# Patient Record
Sex: MALE | Race: WHITE | Employment: FULL TIME
[De-identification: names, ages, dates, MRNs, and addresses within clinical notes are randomized per-mention and may not be internally consistent; named-entity substitution may affect disease eponyms.]

---

## 2017-01-18 ENCOUNTER — OFFICE VISIT (OUTPATIENT)
Dept: FAMILY MEDICINE CLINIC | Facility: CLINIC | Age: 52
End: 2017-01-18

## 2017-01-18 VITALS
BODY MASS INDEX: 31.08 KG/M2 | DIASTOLIC BLOOD PRESSURE: 68 MMHG | HEART RATE: 82 BPM | HEIGHT: 71 IN | WEIGHT: 222 LBS | SYSTOLIC BLOOD PRESSURE: 118 MMHG

## 2017-01-18 DIAGNOSIS — M1A.09X0 IDIOPATHIC CHRONIC GOUT OF MULTIPLE SITES WITHOUT TOPHUS: ICD-10-CM

## 2017-01-18 DIAGNOSIS — Z00.00 PHYSICAL EXAM: Primary | ICD-10-CM

## 2017-01-18 DIAGNOSIS — Z12.11 COLON CANCER SCREENING: ICD-10-CM

## 2017-01-18 DIAGNOSIS — Z12.5 PROSTATE CANCER SCREENING: ICD-10-CM

## 2017-01-18 PROCEDURE — 99396 PREV VISIT EST AGE 40-64: CPT | Performed by: FAMILY MEDICINE

## 2017-01-18 RX ORDER — HYDROCODONE BITARTRATE AND ACETAMINOPHEN 5; 325 MG/1; MG/1
TABLET ORAL
Refills: 0 | COMMUNITY
Start: 2017-01-15 | End: 2017-01-21

## 2017-01-18 ASSESSMENT — ENCOUNTER SYMPTOMS
BLOOD IN STOOL: 0
DIARRHEA: 0
COUGH: 0
CONSTIPATION: 0
SHORTNESS OF BREATH: 0
ABDOMINAL PAIN: 0
BACK PAIN: 0
WHEEZING: 0

## 2017-03-02 ENCOUNTER — HOSPITAL ENCOUNTER (OUTPATIENT)
Age: 52
Setting detail: SPECIMEN
Discharge: HOME | End: 2017-03-02
Payer: COMMERCIAL

## 2017-03-07 LAB — SURGICAL PATHOLOGY REPORT: NORMAL

## 2018-01-18 ENCOUNTER — OFFICE VISIT (OUTPATIENT)
Dept: FAMILY MEDICINE CLINIC | Age: 53
End: 2018-01-18
Payer: COMMERCIAL

## 2018-01-18 VITALS
BODY MASS INDEX: 30.4 KG/M2 | WEIGHT: 218 LBS | HEART RATE: 109 BPM | OXYGEN SATURATION: 98 % | RESPIRATION RATE: 16 BRPM | SYSTOLIC BLOOD PRESSURE: 140 MMHG | DIASTOLIC BLOOD PRESSURE: 94 MMHG

## 2018-01-18 DIAGNOSIS — M1A.09X0 IDIOPATHIC CHRONIC GOUT OF MULTIPLE SITES WITHOUT TOPHUS: ICD-10-CM

## 2018-01-18 PROCEDURE — 1036F TOBACCO NON-USER: CPT | Performed by: NURSE PRACTITIONER

## 2018-01-18 PROCEDURE — G8417 CALC BMI ABV UP PARAM F/U: HCPCS | Performed by: NURSE PRACTITIONER

## 2018-01-18 PROCEDURE — G8427 DOCREV CUR MEDS BY ELIG CLIN: HCPCS | Performed by: NURSE PRACTITIONER

## 2018-01-18 PROCEDURE — 3017F COLORECTAL CA SCREEN DOC REV: CPT | Performed by: NURSE PRACTITIONER

## 2018-01-18 PROCEDURE — G8484 FLU IMMUNIZE NO ADMIN: HCPCS | Performed by: NURSE PRACTITIONER

## 2018-01-18 PROCEDURE — 99213 OFFICE O/P EST LOW 20 MIN: CPT | Performed by: NURSE PRACTITIONER

## 2018-01-18 RX ORDER — INDOMETHACIN 50 MG/1
50 CAPSULE ORAL
Qty: 21 CAPSULE | Refills: 3 | Status: SHIPPED | OUTPATIENT
Start: 2018-01-18 | End: 2018-09-11 | Stop reason: SDUPTHER

## 2018-01-18 RX ORDER — PREDNISONE 20 MG/1
20 TABLET ORAL DAILY
Qty: 20 TABLET | Refills: 0 | Status: SHIPPED | OUTPATIENT
Start: 2018-01-18 | End: 2018-07-09 | Stop reason: ALTCHOICE

## 2018-01-18 ASSESSMENT — ENCOUNTER SYMPTOMS
TROUBLE SWALLOWING: 0
VOMITING: 0
DIARRHEA: 0
SHORTNESS OF BREATH: 0
CHEST TIGHTNESS: 0
NAUSEA: 0
CONSTIPATION: 0

## 2018-01-18 ASSESSMENT — PATIENT HEALTH QUESTIONNAIRE - PHQ9
1. LITTLE INTEREST OR PLEASURE IN DOING THINGS: 0
2. FEELING DOWN, DEPRESSED OR HOPELESS: 0
SUM OF ALL RESPONSES TO PHQ QUESTIONS 1-9: 0
SUM OF ALL RESPONSES TO PHQ9 QUESTIONS 1 & 2: 0

## 2018-01-18 NOTE — PATIENT INSTRUCTIONS
asthma attack or severe allergic reaction after taking aspirin or an NSAID. To make sure indomethacin is safe for you, tell your doctor if you have:  · heart disease, high blood pressure, high cholesterol, diabetes, or if you smoke;  · a history of heart attack, stroke, or blood clot;  · a history of stomach ulcers or bleeding;  · asthma;  · liver or kidney disease; or  · fluid retention. Taking indomethacin during the last 3 months of pregnancy may harm the unborn baby. Tell your doctor if you are pregnant or plan to become pregnant while using indomethacin. Indomethacin can pass into breast milk and may harm a nursing baby. You should not breast-feed while using this medicine. Indomethacin is not approved for use by anyone younger than 15years old. How should I take indomethacin? Follow all directions on your prescription label. Do not take this medicine in larger amounts or for longer than recommended. Use the lowest dose that is effective in treating your condition. Do not crush, chew, break, or open an extended-release capsule. Swallow it whole. If you take indomethacin for a long period of time, your doctor may want to check you on a regular basis to make sure this medicine is not causing harmful effects. Store at room temperature away from moisture and heat. Do not allow the liquid medicine to freeze. What happens if I miss a dose? Take the missed dose as soon as you remember. Skip the missed dose if it is almost time for your next scheduled dose. Do not take extra medicine to make up the missed dose. What happens if I overdose? Seek emergency medical attention or call the Poison Help line at 1-642.788.9272. Overdose symptoms may include vomiting, severe headache, dizziness, confusion, numbness, tingling, or seizure (convulsions). What should I avoid while taking indomethacin? Avoid drinking alcohol. It may increase your risk of stomach bleeding.   Avoid taking aspirin while you are taking indomethacin. Ask a doctor or pharmacist before using any cold, allergy, or pain medicine. Many medicines available over the counter contain aspirin or other medicines similar to indomethacin. Taking certain products together can cause you to get too much of this type of medication. Check the label to see if a medicine contains aspirin, ibuprofen, ketoprofen, or naproxen. What are the possible side effects of indomethacin? Get emergency medical help if you have signs of an allergic reaction: sneezing, runny or stuffy nose; wheezing or trouble breathing; hives; swelling of your face, lips, tongue, or throat. Get emergency medical help if you have signs of a heart attack or stroke: chest pain spreading to your jaw or shoulder, sudden numbness or weakness on one side of the body, slurred speech, feeling short of breath. Stop using indomethacin and call your doctor at once if you have:  · changes in your vision;  · shortness of breath (even with mild exertion); · swelling or rapid weight gain;  · the first sign of any skin rash, no matter how mild;  · signs of stomach bleeding --bloody or tarry stools, coughing up blood or vomit that looks like coffee grounds;  · liver problems --nausea, upper stomach pain, itching, tired feeling, flu-like symptoms, loss of appetite, dark urine, abbie-colored stools, jaundice (yellowing of the skin or eyes);  · kidney problems --little or no urinating, painful or difficult urination, swelling in your feet or ankles, feeling tired or short of breath;  · low red blood cells (anemia) --pale skin, feeling light-headed or short of breath, rapid heart rate, trouble concentrating; or  · severe skin reaction --fever, sore throat, swelling in your face or tongue, burning in your eyes, skin pain followed by a red or purple skin rash that spreads (especially in the face or upper body) and causes blistering and peeling.   Common side effects may include:  · upset stomach, nausea, vomiting;  · diarrhea, constipation;  · headache, dizziness, drowsiness;  · feeling tired or depressed; or  · ringing in your ears. This is not a complete list of side effects and others may occur. Call your doctor for medical advice about side effects. You may report side effects to FDA at 6-003-DZX-5713. What other drugs will affect indomethacin? Ask your doctor before using indomethacin if you take an antidepressant such as citalopram, escitalopram, fluoxetine (Prozac), fluvoxamine, paroxetine, sertraline (Zoloft), trazodone, or vilazodone. Taking any of these medicines with an NSAID may cause you to bruise or bleed easily. Ask a doctor or pharmacist if it is safe for you to use indomethacin if you are also using any of the following drugs:  · cyclosporine;  · lithium;  · methotrexate;  · probenecid;  · a blood thinner (warfarin, Coumadin, Jantoven);  · heart or blood pressure medication, including a diuretic or \"water pill\"; or  · steroid medicine (such as prednisone). This list is not complete. Other drugs may interact with indomethacin, including prescription and over-the-counter medicines, vitamins, and herbal products. Not all possible interactions are listed in this medication guide. Where can I get more information? Your pharmacist can provide more information about indomethacin. Remember, keep this and all other medicines out of the reach of children, never share your medicines with others, and use this medication only for the indication prescribed. Every effort has been made to ensure that the information provided by [facility] [address] [address] is accurate, up-to-date, and complete, but no guarantee is made to that effect. Drug information contained herein may be time sensitive.  [facility] information has been compiled for use by healthcare practitioners and consumers in the [place] and therefore [employer] does not warrant that uses outside of the [place] are appropriate, unless

## 2018-01-18 NOTE — PROGRESS NOTES
Yany Huber is a 47 y.o. male who presents today for his medical conditions/complaints as noted below. Yany Huber is here today c/o Gout (left foot )      HPI:     JAS Tamayo presents today with complaints of a gout flare of the left foot. He states he has had gout for the past ~20 years and his attacks are becoming more frequent. He states historically when his weight has been under 200lbs he has less issue. He states he does not take his allopurinol daily stating he historically has a flare and is treated with steroids. When the flare is resolved he starts the allopurinol but a flare begins again and he aborts all medications. This flare has been present for the past 2-3 days. He states last week he has some pain in the arch of his foot that eventually radiated to his left great toe joint. He admits to eating red meat and shellfish. He does not drink alcohol. He states he believes his knees are beginning to be affected. He states he can feel gout issues in bilateral ankles. Subjective:   Review of Systems   Constitutional: Negative for chills, fatigue and fever. HENT: Negative for trouble swallowing. Respiratory: Negative for chest tightness and shortness of breath. Cardiovascular: Negative for chest pain and palpitations. Gastrointestinal: Negative for constipation, diarrhea, nausea and vomiting. Genitourinary: Negative for dysuria. Musculoskeletal: Positive for arthralgias (left great toe) and gait problem. Neurological: Negative for dizziness, syncope, light-headedness and headaches. Objective:   BP (!) 140/94   Pulse 109   Resp 16   Wt 218 lb (98.9 kg)   SpO2 98%   BMI 30.40 kg/m²     Physical Exam   Constitutional: He is oriented to person, place, and time. He appears well-developed and well-nourished. No distress. HENT:   Head: Normocephalic. Eyes: Conjunctivae are normal.   Neck: Normal range of motion.    Cardiovascular: Normal rate, regular rhythm and

## 2018-01-26 ENCOUNTER — TELEPHONE (OUTPATIENT)
Dept: FAMILY MEDICINE CLINIC | Age: 53
End: 2018-01-26

## 2018-01-26 RX ORDER — COLCHICINE 0.6 MG/1
TABLET ORAL
Qty: 12 TABLET | Refills: 0 | Status: SHIPPED | OUTPATIENT
Start: 2018-01-26 | End: 2021-06-17

## 2018-01-26 NOTE — TELEPHONE ENCOUNTER
Patient advised, he has taken colchicine before and it helped.  rx sent to dr Amador Arellano for approval

## 2018-07-09 ENCOUNTER — OFFICE VISIT (OUTPATIENT)
Dept: FAMILY MEDICINE CLINIC | Age: 53
End: 2018-07-09
Payer: COMMERCIAL

## 2018-07-09 VITALS
BODY MASS INDEX: 31.78 KG/M2 | DIASTOLIC BLOOD PRESSURE: 62 MMHG | HEART RATE: 87 BPM | SYSTOLIC BLOOD PRESSURE: 124 MMHG | WEIGHT: 222 LBS | HEIGHT: 70 IN

## 2018-07-09 DIAGNOSIS — M70.22 OLECRANON BURSITIS OF LEFT ELBOW: ICD-10-CM

## 2018-07-09 DIAGNOSIS — M1A.09X0 IDIOPATHIC CHRONIC GOUT OF MULTIPLE SITES WITHOUT TOPHUS: Primary | ICD-10-CM

## 2018-07-09 DIAGNOSIS — R23.8 OTHER SKIN CHANGES: ICD-10-CM

## 2018-07-09 PROCEDURE — 99213 OFFICE O/P EST LOW 20 MIN: CPT | Performed by: FAMILY MEDICINE

## 2018-07-09 PROCEDURE — G8427 DOCREV CUR MEDS BY ELIG CLIN: HCPCS | Performed by: FAMILY MEDICINE

## 2018-07-09 PROCEDURE — 3017F COLORECTAL CA SCREEN DOC REV: CPT | Performed by: FAMILY MEDICINE

## 2018-07-09 PROCEDURE — 1036F TOBACCO NON-USER: CPT | Performed by: FAMILY MEDICINE

## 2018-07-09 PROCEDURE — G8417 CALC BMI ABV UP PARAM F/U: HCPCS | Performed by: FAMILY MEDICINE

## 2018-07-09 RX ORDER — ALLOPURINOL 100 MG/1
TABLET ORAL
Qty: 30 TABLET | Refills: 3 | Status: SHIPPED | OUTPATIENT
Start: 2018-07-09 | End: 2018-08-23 | Stop reason: ALTCHOICE

## 2018-07-09 RX ORDER — PREDNISONE 20 MG/1
TABLET ORAL
Qty: 20 TABLET | Refills: 0 | Status: SHIPPED | OUTPATIENT
Start: 2018-07-09 | End: 2018-09-11 | Stop reason: SDUPTHER

## 2018-07-09 ASSESSMENT — ENCOUNTER SYMPTOMS
ABDOMINAL PAIN: 0
DIARRHEA: 0
SHORTNESS OF BREATH: 0
WHEEZING: 0
COUGH: 0
BLOOD IN STOOL: 0
BACK PAIN: 0
CONSTIPATION: 0

## 2018-07-09 NOTE — PROGRESS NOTES
Cardiovascular: Normal rate, regular rhythm and normal heart sounds. Exam reveals no gallop and no friction rub. No murmur heard. Pulmonary/Chest: Breath sounds normal. No respiratory distress. He has no wheezes. He has no rales. He exhibits no tenderness. Abdominal: There is no tenderness. Musculoskeletal: He exhibits deformity (left olecranon bursal swelling.). He exhibits no edema or tenderness. Lymphadenopathy:     He has no cervical adenopathy. Neurological: He is alert and oriented to person, place, and time. No cranial nerve deficit. Coordination normal.   Skin: No rash noted. He is not diaphoretic. Psychiatric: He has a normal mood and affect. His behavior is normal. Judgment and thought content normal.       Assessment:       Diagnosis Orders   1. Idiopathic chronic gout of multiple sites without tophus     2. Olecranon bursitis of left elbow     3. Other skin changes  Yuri Albright MD, Dermatology [facility]:      No Follow-up on file. Orders Placed This Encounter   Procedures   Yuri Albright MD, Dermatology [facility]     Referral Priority:   Routine     Referral Type:   Eval and Treat     Referral Reason:   Specialty Services Required     Referred to Provider:   Tosha Partida MD     Requested Specialty:   Dermatology     Number of Visits Requested:   1     Orders Placed This Encounter   Medications    allopurinol (ZYLOPRIM) 100 MG tablet     Sig: Take 1/2 tab for 30 days then 1 tab daily thereafter. Dispense:  30 tablet     Refill:  3    predniSONE (DELTASONE) 20 MG tablet     Sig: Take 3 tabs daily for 3 days, then 2 a day for 3 days, then 1 a day for 3 days, then 1/2 a day for 3 days. Dispense:  20 tablet     Refill:  0     To try and avoid a gout attack will start with 50 mg of allopurinol. If tolerates at four weeks increase to 100 mg.   He wishes to have skin exam by Dermatologist.

## 2018-08-23 ENCOUNTER — OFFICE VISIT (OUTPATIENT)
Dept: DERMATOLOGY | Age: 53
End: 2018-08-23
Payer: COMMERCIAL

## 2018-08-23 VITALS
HEART RATE: 94 BPM | DIASTOLIC BLOOD PRESSURE: 80 MMHG | HEIGHT: 71 IN | BODY MASS INDEX: 31.64 KG/M2 | OXYGEN SATURATION: 97 % | WEIGHT: 226 LBS | SYSTOLIC BLOOD PRESSURE: 114 MMHG

## 2018-08-23 DIAGNOSIS — L73.8 SEBACEOUS HYPERPLASIA: Primary | ICD-10-CM

## 2018-08-23 DIAGNOSIS — L82.1 SEBORRHEIC KERATOSES: ICD-10-CM

## 2018-08-23 DIAGNOSIS — D22.9 MULTIPLE BENIGN NEVI: ICD-10-CM

## 2018-08-23 PROCEDURE — 99203 OFFICE O/P NEW LOW 30 MIN: CPT | Performed by: DERMATOLOGY

## 2018-08-23 NOTE — PROGRESS NOTES
dermatologists office is a simple and safe procedure that can be done with local anesthesia. PREVENTION  You can do some things to prevent moles from becoming cancerous:       Try to avoid long periods of time in the sun and severe sunburns. The sun is        especially dangerous between 10:00 am and 4:00 pm.       Use a broad spectrum, water-resistant sun block lotion with an SPF of 30 or        greater. A broad spectrum lotion blocks both UVA and UVB rays from the sun. Re-apply sunscreen at least every 2 hours and after swimming or sweating.      Take advantage of shade whenever possible. Wear a broad-brimmed hat,        sunglasses, and protective clothing when outdoors.      Do not use tanning beds. Photo surveillance performed: Yes    Follow-up: Return in about 1 year (around 8/23/2019) for tbse. This note was created with the assistance of a speech-recognition program.  Although the intention is to generate a document that actually reflects the content of the visit, no guarantees can be provided that every mistake has been identified and corrected by editing.     Electronically signed by Madeleine Kraft MD on 8/23/18 at 2:02 PM

## 2018-08-23 NOTE — PATIENT INSTRUCTIONS
Follow up in 1 year for full body skin check    Referral for cosmetics:  -Dr. Lennox Gills   [phone]  [facility]   [phone]  -[facility]   [phone], or nevi, are very common. Moles are areas of the skin where there are more cells called melanocytes. Melanocytes are the cells in the body that produce pigment, or color. Moles can be many colors including skin-tone, pink, tan, brown, and very dark brown to black. Moles can be raised or flat. Moles can have hair. Moles can grow on any skin surface, including the scalp, hands and feet. When someone is born with a mole, or develops one in the first months of life, the mole is called a congenital, or birthmark mole. About 1 in 100 people are born with one or more moles. Most people develop their moles later in childhood or adulthood. These are called acquired moles. They are most common on sun exposed areas of skin such as the face, neck, upper body, arms and legs. CHECKING MOLES  Most moles are harmless, but in rare cases moles may become cancerous. Checking moles and looking for changes is an important step in helping to catch worrisome changes early. Some changes to look for are asymmetry (moles that do not look the same on each half), irregular shapes or borders, uneven color or large size. Also look for any moles that bleed, itch, or become painful. Looking at your skin regularly can help you recognize moles that are more at risk for becoming cancerous. WHEN TO CALL THE DOCTOR  Call your doctor if you see any of the following changes in a mole:       Irregular borders (uneven shape or edges)       Changes in color to black, blue or red.      Changes in the surface texture       Scabs, scaling, irritation or bleeding in the mole    TREATMENT FOR MOLES  Often we can simply look at your moles and tell you if they look worrisome.   If we are not concerned about the look of your moles at your appointment, we may measure some moles and take some photos that will allow us to watch for future changes in the moles. TREATMENT FOR MOLES  If a mole is getting irritated frequently, bleeding, difficult to watch due to location or dark color, atypical in appearance, or worrisome, we may perform a skin biopsy. A skin biopsy is a procedure that involves removing the mole so that it can be looked at under a microscope. There are many methods used to remove moles. The method we choose depends on the location of the mole, the size of the mole, and the amount of concern for skin cancer. Generally, removing moles in the dermatologists office is a simple and safe procedure that can be done with local anesthesia. PREVENTION  You can do some things to prevent moles from becoming cancerous:       Try to avoid long periods of time in the sun and severe sunburns. The sun is        especially dangerous between 10:00 am and 4:00 pm.       Use a broad spectrum, water-resistant sun block lotion with an SPF of 30 or        greater. A broad spectrum lotion blocks both UVA and UVB rays from the sun. Re-apply sunscreen at least every 2 hours and after swimming or sweating.      Take advantage of shade whenever possible. Wear a broad-brimmed hat,        sunglasses, and protective clothing when outdoors.      Do not use tanning beds.

## 2018-09-11 DIAGNOSIS — M1A.09X0 IDIOPATHIC CHRONIC GOUT OF MULTIPLE SITES WITHOUT TOPHUS: ICD-10-CM

## 2018-09-11 RX ORDER — INDOMETHACIN 50 MG/1
CAPSULE ORAL
Qty: 90 CAPSULE | Refills: 3 | Status: SHIPPED | OUTPATIENT
Start: 2018-09-11 | End: 2019-07-10 | Stop reason: SDUPTHER

## 2018-09-11 RX ORDER — PREDNISONE 20 MG/1
TABLET ORAL
Qty: 20 TABLET | Refills: 0 | Status: SHIPPED | OUTPATIENT
Start: 2018-09-11 | End: 2018-11-07 | Stop reason: SDUPTHER

## 2018-11-07 RX ORDER — PREDNISONE 20 MG/1
TABLET ORAL
Qty: 18 TABLET | Refills: 0 | Status: SHIPPED | OUTPATIENT
Start: 2018-11-07 | End: 2020-06-03 | Stop reason: SDUPTHER

## 2019-05-03 ENCOUNTER — TELEPHONE (OUTPATIENT)
Dept: FAMILY MEDICINE CLINIC | Age: 54
End: 2019-05-03

## 2019-05-03 RX ORDER — PREDNISONE 20 MG/1
TABLET ORAL
Qty: 20 TABLET | Refills: 0 | Status: SHIPPED | OUTPATIENT
Start: 2019-05-03 | End: 2019-12-04 | Stop reason: SDUPTHER

## 2019-05-03 NOTE — TELEPHONE ENCOUNTER
Patients spouse called stating Belgica Dunaway is having a major flair up with his gout and is asking for a steroid to be called into pharmacy on file. Please contact her at earliest convenience. Phone number on file has been verified. Thank you.

## 2019-10-25 DIAGNOSIS — M1A.09X0 IDIOPATHIC CHRONIC GOUT OF MULTIPLE SITES WITHOUT TOPHUS: ICD-10-CM

## 2019-10-25 RX ORDER — INDOMETHACIN 50 MG/1
50 CAPSULE ORAL
Qty: 90 CAPSULE | Refills: 0 | Status: SHIPPED | OUTPATIENT
Start: 2019-10-25 | End: 2019-12-02 | Stop reason: SDUPTHER

## 2019-12-02 ENCOUNTER — TELEPHONE (OUTPATIENT)
Dept: FAMILY MEDICINE CLINIC | Age: 54
End: 2019-12-02

## 2019-12-02 DIAGNOSIS — M1A.09X0 IDIOPATHIC CHRONIC GOUT OF MULTIPLE SITES WITHOUT TOPHUS: ICD-10-CM

## 2019-12-02 RX ORDER — INDOMETHACIN 50 MG/1
50 CAPSULE ORAL
Qty: 24 CAPSULE | Refills: 0 | Status: SHIPPED | OUTPATIENT
Start: 2019-12-02 | End: 2020-02-05 | Stop reason: SDUPTHER

## 2019-12-02 RX ORDER — PREDNISONE 20 MG/1
TABLET ORAL
Qty: 20 TABLET | Refills: 0 | Status: SHIPPED | OUTPATIENT
Start: 2019-12-02 | End: 2020-09-04 | Stop reason: ALTCHOICE

## 2019-12-04 ENCOUNTER — OFFICE VISIT (OUTPATIENT)
Dept: FAMILY MEDICINE CLINIC | Age: 54
End: 2019-12-04
Payer: COMMERCIAL

## 2019-12-04 VITALS
HEIGHT: 71 IN | WEIGHT: 221 LBS | OXYGEN SATURATION: 96 % | DIASTOLIC BLOOD PRESSURE: 84 MMHG | HEART RATE: 85 BPM | SYSTOLIC BLOOD PRESSURE: 138 MMHG | BODY MASS INDEX: 30.94 KG/M2

## 2019-12-04 DIAGNOSIS — M1A.09X0 IDIOPATHIC CHRONIC GOUT OF MULTIPLE SITES WITHOUT TOPHUS: Primary | ICD-10-CM

## 2019-12-04 PROCEDURE — G8419 CALC BMI OUT NRM PARAM NOF/U: HCPCS | Performed by: FAMILY MEDICINE

## 2019-12-04 PROCEDURE — 99213 OFFICE O/P EST LOW 20 MIN: CPT | Performed by: FAMILY MEDICINE

## 2019-12-04 PROCEDURE — 1036F TOBACCO NON-USER: CPT | Performed by: FAMILY MEDICINE

## 2019-12-04 PROCEDURE — G8427 DOCREV CUR MEDS BY ELIG CLIN: HCPCS | Performed by: FAMILY MEDICINE

## 2019-12-04 PROCEDURE — 3017F COLORECTAL CA SCREEN DOC REV: CPT | Performed by: FAMILY MEDICINE

## 2019-12-04 PROCEDURE — G8484 FLU IMMUNIZE NO ADMIN: HCPCS | Performed by: FAMILY MEDICINE

## 2019-12-04 RX ORDER — ALLOPURINOL 100 MG/1
100 TABLET ORAL DAILY
Qty: 30 TABLET | Refills: 5 | Status: SHIPPED | OUTPATIENT
Start: 2019-12-04 | End: 2020-09-04 | Stop reason: ALTCHOICE

## 2019-12-04 RX ORDER — PREDNISONE 20 MG/1
TABLET ORAL
Qty: 20 TABLET | Refills: 0 | Status: SHIPPED | OUTPATIENT
Start: 2019-12-04 | End: 2020-02-05 | Stop reason: SDUPTHER

## 2019-12-04 ASSESSMENT — ENCOUNTER SYMPTOMS
BACK PAIN: 0
WHEEZING: 0
CONSTIPATION: 0
DIARRHEA: 0
BLOOD IN STOOL: 0
SHORTNESS OF BREATH: 0
ABDOMINAL PAIN: 0
COUGH: 0

## 2019-12-04 ASSESSMENT — PATIENT HEALTH QUESTIONNAIRE - PHQ9
SUM OF ALL RESPONSES TO PHQ QUESTIONS 1-9: 0
SUM OF ALL RESPONSES TO PHQ9 QUESTIONS 1 & 2: 0
1. LITTLE INTEREST OR PLEASURE IN DOING THINGS: 0
2. FEELING DOWN, DEPRESSED OR HOPELESS: 0
SUM OF ALL RESPONSES TO PHQ QUESTIONS 1-9: 0

## 2020-01-10 LAB
BASOPHILS ABSOLUTE: 0.04 /ΜL
BASOPHILS RELATIVE PERCENT: 0.7 %
EOSINOPHILS ABSOLUTE: 0.1 /ΜL
EOSINOPHILS RELATIVE PERCENT: 1.6 %
HCT VFR BLD CALC: 43.2 % (ref 41–53)
HEMOGLOBIN: 14.2 G/DL (ref 13.5–17.5)
LYMPHOCYTES ABSOLUTE: 1.81 /ΜL
LYMPHOCYTES RELATIVE PERCENT: 9.5 %
MCH RBC QN AUTO: 31.5 PG
MCHC RBC AUTO-ENTMCNC: 32.9 G/DL
MCV RBC AUTO: 95.8 FL
MONOCYTES ABSOLUTE: 0.52 /ΜL
MONOCYTES RELATIVE PERCENT: 8.5 %
NEUTROPHILS ABSOLUTE: 3.65 /ΜL
NEUTROPHILS RELATIVE PERCENT: ABNORMAL
PDW BLD-RTO: 45.3 %
PLATELET # BLD: 292 K/ΜL
PMV BLD AUTO: ABNORMAL FL
RBC # BLD: 4.51 10^6/ΜL
SEDIMENTATION RATE, ERYTHROCYTE: 16
URIC ACID: 9
WBC # BLD: 6.13 10^3/ML

## 2020-02-06 RX ORDER — PREDNISONE 20 MG/1
TABLET ORAL
Qty: 20 TABLET | Refills: 0 | Status: SHIPPED | OUTPATIENT
Start: 2020-02-06 | End: 2020-09-04 | Stop reason: ALTCHOICE

## 2020-02-06 RX ORDER — INDOMETHACIN 50 MG/1
50 CAPSULE ORAL
Qty: 24 CAPSULE | Refills: 0 | Status: SHIPPED | OUTPATIENT
Start: 2020-02-06 | End: 2020-03-26 | Stop reason: SDUPTHER

## 2020-02-06 NOTE — TELEPHONE ENCOUNTER
Next Visit Date:  No future appointments.     Health Maintenance   Topic Date Due    HIV screen  11/12/1981    Diabetes screen  11/12/2006    Shingles Vaccine (1 of 2) 11/12/2016    Flu vaccine (1) 09/01/2019    Lipid screen  01/18/2022    Colon cancer screen colonoscopy  03/02/2027    DTaP/Tdap/Td vaccine (2 - Td) 04/29/2029    Hepatitis A vaccine  Aged Out    Hepatitis B vaccine  Aged Out    Hib vaccine  Aged Out    Meningococcal (ACWY) vaccine  Aged Out    Pneumococcal 0-64 years Vaccine  Aged Out       No results found for: LABA1C          ( goal A1C is < 7)   No results found for: LABMICR  LDL Cholesterol (mg/dL)   Date Value   01/18/2017 103   01/19/2016 145 (H)       (goal LDL is <100)   AST (U/L)   Date Value   01/18/2017 23     ALT (U/L)   Date Value   01/18/2017 25     BUN (mg/dL)   Date Value   01/18/2017 18     BP Readings from Last 3 Encounters:   12/04/19 138/84   08/23/18 114/80   07/09/18 124/62          (goal 120/80)    All Future Testing planned in [facility]              Patient Active Problem List:     Gout of multiple sites

## 2020-04-07 RX ORDER — ALLOPURINOL 100 MG/1
200 TABLET ORAL DAILY
Qty: 60 TABLET | Refills: 5 | Status: SHIPPED | OUTPATIENT
Start: 2020-04-07 | End: 2020-09-04 | Stop reason: ALTCHOICE

## 2020-04-07 NOTE — TELEPHONE ENCOUNTER
Next Visit Date:  No future appointments.     Health Maintenance   Topic Date Due    HIV screen  11/12/1981    Diabetes screen  11/12/2006    Shingles Vaccine (1 of 2) 11/12/2016    Flu vaccine (Season Ended) 09/01/2020    Lipid screen  01/18/2022    Colon cancer screen colonoscopy  03/02/2027    DTaP/Tdap/Td vaccine (2 - Td) 04/29/2029    Hepatitis A vaccine  Aged Out    Hepatitis B vaccine  Aged Out    Hib vaccine  Aged Out    Meningococcal (ACWY) vaccine  Aged Out    Pneumococcal 0-64 years Vaccine  Aged Out       No results found for: LABA1C          ( goal A1C is < 7)   No results found for: LABMICR  LDL Cholesterol (mg/dL)   Date Value   01/18/2017 103   01/19/2016 145 (H)       (goal LDL is <100)   AST (U/L)   Date Value   01/18/2017 23     ALT (U/L)   Date Value   01/18/2017 25     BUN (mg/dL)   Date Value   01/18/2017 18     BP Readings from Last 3 Encounters:   12/04/19 138/84   08/23/18 114/80   07/09/18 124/62          (goal 120/80)    All Future Testing planned in [facility]              Patient Active Problem List:     Gout of multiple sites

## 2020-04-13 ENCOUNTER — PATIENT MESSAGE (OUTPATIENT)
Dept: FAMILY MEDICINE CLINIC | Age: 55
End: 2020-04-13

## 2020-04-14 NOTE — TELEPHONE ENCOUNTER
From: Julee Matthew  To: Suzanne Palacios MD  Sent: 4/13/2020 11:25 PM EDT  Subject: Non-Urgent Medical Question    I NEED TO GET BLOOD WORK FOR GOUT URIC ACID ETC. DO I NEED AN ORDER OR CAN I JUST GO? CAN WE TEST FOR OTHER INFLAMMATION MARKERS?

## 2020-05-05 ENCOUNTER — HOSPITAL ENCOUNTER (OUTPATIENT)
Age: 55
Discharge: HOME | End: 2020-05-05
Payer: COMMERCIAL

## 2020-05-05 LAB
ABSOLUTE EOS #: 0.17 K/UL (ref 0–0.44)
ABSOLUTE IMMATURE GRANULOCYTE: <0.03 K/UL (ref 0–0.3)
ABSOLUTE LYMPH #: 2.08 K/UL (ref 1.1–3.7)
ABSOLUTE MONO #: 0.44 K/UL (ref 0.1–1.2)
BASOPHILS # BLD: 1 % (ref 0–2)
BASOPHILS ABSOLUTE: 0.03 K/UL (ref 0–0.2)
C-REACTIVE PROTEIN: 1.2 MG/L (ref 0–5)
DIFFERENTIAL TYPE: NORMAL
EOSINOPHILS RELATIVE PERCENT: 3 % (ref 1–4)
HCT VFR BLD CALC: 45.2 % (ref 40.7–50.3)
HEMOGLOBIN: 14.6 G/DL (ref 13–17)
IMMATURE GRANULOCYTES: 0 %
LYMPHOCYTES # BLD: 34 % (ref 24–43)
MCH RBC QN AUTO: 31.4 PG (ref 25.2–33.5)
MCHC RBC AUTO-ENTMCNC: 32.3 G/DL (ref 28.4–34.8)
MCV RBC AUTO: 97.2 FL (ref 82.6–102.9)
MONOCYTES # BLD: 7 % (ref 3–12)
NRBC AUTOMATED: 0 PER 100 WBC
PDW BLD-RTO: 12.7 % (ref 11.8–14.4)
PLATELET # BLD: 267 K/UL (ref 138–453)
PLATELET ESTIMATE: NORMAL
PMV BLD AUTO: 10 FL (ref 8.1–13.5)
RBC # BLD: 4.65 M/UL (ref 4.21–5.77)
RBC # BLD: NORMAL 10*6/UL
SEDIMENTATION RATE, ERYTHROCYTE: 8 MM (ref 0–10)
SEG NEUTROPHILS: 55 % (ref 36–65)
SEGMENTED NEUTROPHILS ABSOLUTE COUNT: 3.42 K/UL (ref 1.5–8.1)
URIC ACID: 7.3 MG/DL (ref 3.4–7)
WBC # BLD: 6.2 K/UL (ref 3.5–11.3)
WBC # BLD: NORMAL 10*3/UL

## 2020-05-05 PROCEDURE — 85651 RBC SED RATE NONAUTOMATED: CPT

## 2020-05-05 PROCEDURE — 86140 C-REACTIVE PROTEIN: CPT

## 2020-05-05 PROCEDURE — 36415 COLL VENOUS BLD VENIPUNCTURE: CPT

## 2020-05-05 PROCEDURE — 84550 ASSAY OF BLOOD/URIC ACID: CPT

## 2020-05-05 PROCEDURE — 85025 COMPLETE CBC W/AUTO DIFF WBC: CPT

## 2020-05-06 RX ORDER — ALLOPURINOL 300 MG/1
300 TABLET ORAL DAILY
Qty: 30 TABLET | Refills: 6 | Status: SHIPPED | OUTPATIENT
Start: 2020-05-06 | End: 2021-02-22 | Stop reason: SDUPTHER

## 2020-06-03 RX ORDER — PREDNISONE 20 MG/1
TABLET ORAL
Qty: 18 TABLET | Refills: 0 | Status: SHIPPED | OUTPATIENT
Start: 2020-06-03 | End: 2020-09-04 | Stop reason: ALTCHOICE

## 2020-06-03 RX ORDER — INDOMETHACIN 50 MG/1
50 CAPSULE ORAL
Qty: 24 CAPSULE | Refills: 0 | Status: SHIPPED | OUTPATIENT
Start: 2020-06-03 | End: 2020-07-14

## 2020-07-14 RX ORDER — INDOMETHACIN 50 MG/1
CAPSULE ORAL
Qty: 24 CAPSULE | Refills: 0 | Status: SHIPPED | OUTPATIENT
Start: 2020-07-14 | End: 2020-08-20

## 2020-07-14 NOTE — TELEPHONE ENCOUNTER
Next Visit Date:  No future appointments.     Health Maintenance   Topic Date Due    HIV screen  11/12/1981    Diabetes screen  11/12/2006    Shingles Vaccine (1 of 2) 11/12/2016    Flu vaccine (1) 09/01/2020    Lipid screen  01/18/2022    Colon cancer screen colonoscopy  03/02/2027    DTaP/Tdap/Td vaccine (2 - Td) 04/29/2029    Hepatitis A vaccine  Aged Out    Hepatitis B vaccine  Aged Out    Hib vaccine  Aged Out    Meningococcal (ACWY) vaccine  Aged Out    Pneumococcal 0-64 years Vaccine  Aged Out       No results found for: LABA1C          ( goal A1C is < 7)   No results found for: LABMICR  LDL Cholesterol (mg/dL)   Date Value   01/18/2017 103   01/19/2016 145 (H)       (goal LDL is <100)   AST (U/L)   Date Value   01/18/2017 23     ALT (U/L)   Date Value   01/18/2017 25     BUN (mg/dL)   Date Value   01/18/2017 18     BP Readings from Last 3 Encounters:   12/04/19 138/84   08/23/18 114/80   07/09/18 124/62          (goal 120/80)    All Future Testing planned in [facility]  Lab Frequency Next Occurrence   Basic Metabolic Panel Once 03/74/2414   Uric Acid Once 08/06/2020               Patient Active Problem List:     Gout of multiple sites

## 2020-08-20 RX ORDER — INDOMETHACIN 50 MG/1
CAPSULE ORAL
Qty: 24 CAPSULE | Refills: 0 | Status: SHIPPED | OUTPATIENT
Start: 2020-08-20 | End: 2021-06-17

## 2020-09-04 ENCOUNTER — OFFICE VISIT (OUTPATIENT)
Dept: FAMILY MEDICINE CLINIC | Age: 55
End: 2020-09-04
Payer: COMMERCIAL

## 2020-09-04 ENCOUNTER — HOSPITAL ENCOUNTER (OUTPATIENT)
Age: 55
Setting detail: SPECIMEN
Discharge: HOME | End: 2020-09-04
Payer: COMMERCIAL

## 2020-09-04 VITALS
HEART RATE: 91 BPM | WEIGHT: 223 LBS | SYSTOLIC BLOOD PRESSURE: 130 MMHG | DIASTOLIC BLOOD PRESSURE: 60 MMHG | BODY MASS INDEX: 31.22 KG/M2 | OXYGEN SATURATION: 97 % | TEMPERATURE: 97.5 F | HEIGHT: 71 IN

## 2020-09-04 LAB
ABSOLUTE EOS #: 0.15 K/UL (ref 0–0.44)
ABSOLUTE IMMATURE GRANULOCYTE: <0.03 K/UL (ref 0–0.3)
ABSOLUTE LYMPH #: 2.09 K/UL (ref 1.1–3.7)
ABSOLUTE MONO #: 0.5 K/UL (ref 0.1–1.2)
ANION GAP SERPL CALCULATED.3IONS-SCNC: 12 MMOL/L (ref 9–17)
BASOPHILS # BLD: 1 % (ref 0–2)
BASOPHILS ABSOLUTE: 0.04 K/UL (ref 0–0.2)
BUN BLDV-MCNC: 14 MG/DL (ref 6–20)
BUN/CREAT BLD: NORMAL (ref 9–20)
CALCIUM SERPL-MCNC: 10.1 MG/DL (ref 8.6–10.4)
CHLORIDE BLD-SCNC: 104 MMOL/L (ref 98–107)
CO2: 23 MMOL/L (ref 20–31)
CREAT SERPL-MCNC: 1.13 MG/DL (ref 0.7–1.2)
DIFFERENTIAL TYPE: NORMAL
EOSINOPHILS RELATIVE PERCENT: 2 % (ref 1–4)
GFR AFRICAN AMERICAN: >60 ML/MIN
GFR NON-AFRICAN AMERICAN: >60 ML/MIN
GFR SERPL CREATININE-BSD FRML MDRD: NORMAL ML/MIN/{1.73_M2}
GFR SERPL CREATININE-BSD FRML MDRD: NORMAL ML/MIN/{1.73_M2}
GLUCOSE BLD-MCNC: 94 MG/DL (ref 70–99)
HCT VFR BLD CALC: 45.4 % (ref 40.7–50.3)
HEMOGLOBIN: 14.5 G/DL (ref 13–17)
IMMATURE GRANULOCYTES: 0 %
LYMPHOCYTES # BLD: 33 % (ref 24–43)
MCH RBC QN AUTO: 31.6 PG (ref 25.2–33.5)
MCHC RBC AUTO-ENTMCNC: 31.9 G/DL (ref 28.4–34.8)
MCV RBC AUTO: 98.9 FL (ref 82.6–102.9)
MONOCYTES # BLD: 8 % (ref 3–12)
NRBC AUTOMATED: 0 PER 100 WBC
PDW BLD-RTO: 13.5 % (ref 11.8–14.4)
PLATELET # BLD: 338 K/UL (ref 138–453)
PLATELET ESTIMATE: NORMAL
PMV BLD AUTO: 10.2 FL (ref 8.1–13.5)
POTASSIUM SERPL-SCNC: 4.5 MMOL/L (ref 3.7–5.3)
PROSTATE SPECIFIC ANTIGEN: 2.17 UG/L
RBC # BLD: 4.59 M/UL (ref 4.21–5.77)
RBC # BLD: NORMAL 10*6/UL
SEG NEUTROPHILS: 56 % (ref 36–65)
SEGMENTED NEUTROPHILS ABSOLUTE COUNT: 3.58 K/UL (ref 1.5–8.1)
SODIUM BLD-SCNC: 139 MMOL/L (ref 135–144)
WBC # BLD: 6.4 K/UL (ref 3.5–11.3)
WBC # BLD: NORMAL 10*3/UL

## 2020-09-04 PROCEDURE — 99213 OFFICE O/P EST LOW 20 MIN: CPT | Performed by: FAMILY MEDICINE

## 2020-09-04 PROCEDURE — 1036F TOBACCO NON-USER: CPT | Performed by: FAMILY MEDICINE

## 2020-09-04 PROCEDURE — 3017F COLORECTAL CA SCREEN DOC REV: CPT | Performed by: FAMILY MEDICINE

## 2020-09-04 PROCEDURE — G8417 CALC BMI ABV UP PARAM F/U: HCPCS | Performed by: FAMILY MEDICINE

## 2020-09-04 PROCEDURE — G8427 DOCREV CUR MEDS BY ELIG CLIN: HCPCS | Performed by: FAMILY MEDICINE

## 2020-09-04 RX ORDER — PREDNISONE 20 MG/1
TABLET ORAL
Qty: 20 TABLET | Refills: 0 | Status: SHIPPED | OUTPATIENT
Start: 2020-09-04 | End: 2020-10-12

## 2020-09-04 RX ORDER — MELOXICAM 15 MG/1
15 TABLET ORAL DAILY
Qty: 30 TABLET | Refills: 3 | Status: SHIPPED | OUTPATIENT
Start: 2020-09-04

## 2020-09-04 SDOH — ECONOMIC STABILITY: TRANSPORTATION INSECURITY
IN THE PAST 12 MONTHS, HAS THE LACK OF TRANSPORTATION KEPT YOU FROM MEDICAL APPOINTMENTS OR FROM GETTING MEDICATIONS?: NO

## 2020-09-04 SDOH — ECONOMIC STABILITY: FOOD INSECURITY: WITHIN THE PAST 12 MONTHS, THE FOOD YOU BOUGHT JUST DIDN'T LAST AND YOU DIDN'T HAVE MONEY TO GET MORE.: NEVER TRUE

## 2020-09-04 SDOH — ECONOMIC STABILITY: INCOME INSECURITY: HOW HARD IS IT FOR YOU TO PAY FOR THE VERY BASICS LIKE FOOD, HOUSING, MEDICAL CARE, AND HEATING?: NOT HARD AT ALL

## 2020-09-04 SDOH — ECONOMIC STABILITY: TRANSPORTATION INSECURITY
IN THE PAST 12 MONTHS, HAS LACK OF TRANSPORTATION KEPT YOU FROM MEETINGS, WORK, OR FROM GETTING THINGS NEEDED FOR DAILY LIVING?: NO

## 2020-09-04 SDOH — ECONOMIC STABILITY: FOOD INSECURITY: WITHIN THE PAST 12 MONTHS, YOU WORRIED THAT YOUR FOOD WOULD RUN OUT BEFORE YOU GOT MONEY TO BUY MORE.: NEVER TRUE

## 2020-09-04 ASSESSMENT — PATIENT HEALTH QUESTIONNAIRE - PHQ9
SUM OF ALL RESPONSES TO PHQ9 QUESTIONS 1 & 2: 0
2. FEELING DOWN, DEPRESSED OR HOPELESS: 0
SUM OF ALL RESPONSES TO PHQ QUESTIONS 1-9: 0
SUM OF ALL RESPONSES TO PHQ QUESTIONS 1-9: 0
1. LITTLE INTEREST OR PLEASURE IN DOING THINGS: 0

## 2020-09-04 ASSESSMENT — ENCOUNTER SYMPTOMS
WHEEZING: 0
ABDOMINAL PAIN: 0
COUGH: 0
SHORTNESS OF BREATH: 0
CONSTIPATION: 0
BACK PAIN: 1
BLOOD IN STOOL: 0
DIARRHEA: 0

## 2020-09-04 NOTE — PROGRESS NOTES
Basil Parikh is a 49 y.o. male who presents todayfor his medical conditions/complaints as noted below. Basil Parikh is here today c/oGout and Arthritis      :     HPI    Arthralgias come and go. No past medical history on file. Past Surgical History:   Procedure Laterality Date    FRACTURE SURGERY       Family History   Problem Relation Age of Onset    Cancer Mother     Cancer Father     High Blood Pressure Father     Heart Disease Sister      Social History     Tobacco Use    Smoking status: Never Smoker    Smokeless tobacco: Never Used   Substance Use Topics    Alcohol use: No     Alcohol/week: 0.0 standard drinks      Current Outpatient Medications   Medication Sig Dispense Refill    predniSONE (DELTASONE) 20 MG tablet Take 3 tabs daily for 3 days, then 2 a day for 3 days, then 1 a day for 3 days, then 1/2 a day for 3 days. 20 tablet 0    meloxicam (MOBIC) 15 MG tablet Take 1 tablet by mouth daily 30 tablet 3    indomethacin (INDOCIN) 50 MG capsule take 1 capsule by mouth three times a day with meals 24 capsule 0    allopurinol (ZYLOPRIM) 300 MG tablet Take 1 tablet by mouth daily 30 tablet 6    colchicine (COLCRYS) 0.6 MG tablet Take 2 tabs immediately, then 1 tablet 1 hour later. Then one tablet a day for 2 more days. 12 tablet 0     No current facility-administered medications for this visit. No Known Allergies      Subjective:   Review of Systems   Constitutional: Negative for chills, diaphoresis, fatigue and fever. HENT: Negative for congestion and hearing loss. Eyes: Negative for visual disturbance. Respiratory: Negative for cough, shortness of breath and wheezing. Cardiovascular: Negative for chest pain, palpitations and leg swelling. Gastrointestinal: Negative for abdominal pain, blood in stool, constipation and diarrhea. Genitourinary: Negative for dysuria. Musculoskeletal: Positive for arthralgias, back pain and gait problem. Negative for neck pain. Skin: Negative for rash. Neurological: Negative for weakness, numbness and headaches. Psychiatric/Behavioral: Negative for dysphoric mood and sleep disturbance.       :   /60   Pulse 91   Temp 97.5 °F (36.4 °C)   Ht 5' 10.98\" (1.803 m)   Wt 223 lb (101.2 kg)   SpO2 97%   BMI 31.12 kg/m²     Physical Exam  Constitutional:       General: He is not in acute distress. Appearance: He is well-developed. He is not diaphoretic. HENT:      Head: Normocephalic and atraumatic. Mouth/Throat:      Pharynx: No oropharyngeal exudate. Eyes:      General: No scleral icterus. Right eye: No discharge. Left eye: No discharge. Neck:      Musculoskeletal: Neck supple. Thyroid: No thyromegaly. Vascular: No carotid bruit. Cardiovascular:      Rate and Rhythm: Normal rate and regular rhythm. Heart sounds: Normal heart sounds. No murmur. No friction rub. No gallop. Pulmonary:      Effort: No respiratory distress. Breath sounds: Normal breath sounds. No wheezing or rales. Chest:      Chest wall: No tenderness. Abdominal:      Tenderness: There is no abdominal tenderness. Musculoskeletal:         General: Deformity (bunion formation foot.) present. No tenderness. Right lower leg: No edema. Left lower leg: No edema. Lymphadenopathy:      Cervical: No cervical adenopathy. Skin:     Findings: No rash. Neurological:      Mental Status: He is alert and oriented to person, place, and time. Cranial Nerves: No cranial nerve deficit. Coordination: Coordination normal.   Psychiatric:         Behavior: Behavior normal.         Thought Content: Thought content normal.         Judgment: Judgment normal.         Assessment:       Diagnosis Orders   1. Idiopathic chronic gout of multiple sites without tophus  Basic Metabolic Panel    CBC Auto Differential   2. Prostate cancer screening  Psa screening         Plan:      No follow-ups on file.     Orders Placed This Encounter   Procedures    Psa screening     Standing Status:   Future     Standing Expiration Date:   9/4/2021    Basic Metabolic Panel     Standing Status:   Future     Standing Expiration Date:   9/4/2021    CBC Auto Differential     Standing Status:   Future     Standing Expiration Date:   9/4/2021     Orders Placed This Encounter   Medications    predniSONE (DELTASONE) 20 MG tablet     Sig: Take 3 tabs daily for 3 days, then 2 a day for 3 days, then 1 a day for 3 days, then 1/2 a day for 3 days. Dispense:  20 tablet     Refill:  0    meloxicam (MOBIC) 15 MG tablet     Sig: Take 1 tablet by mouth daily     Dispense:  30 tablet     Refill:  3     He is going to [place] for [place] and wishes to have prednisone for emergency. Mobic Rx for arthralgia management. Labs today.

## 2020-10-12 RX ORDER — PREDNISONE 20 MG/1
TABLET ORAL
Qty: 20 TABLET | Refills: 0 | Status: SHIPPED | OUTPATIENT
Start: 2020-10-12 | End: 2021-06-17

## 2020-10-12 NOTE — TELEPHONE ENCOUNTER
Tay Collins is calling to request a refill on the following medication(s):    Medication Request:  Requested Prescriptions     Pending Prescriptions Disp Refills    predniSONE (DELTASONE) 20 MG tablet [Pharmacy Med Name: PREDNISONE 20 MG TABLET] 20 tablet 0     Sig: take 3 tablets by mouth daily for 3 days then take 2 tablets by mouth daily for 3 days then take 1 tablet by mouth daily for 3 days take 1/2 tablet by mouth daily for 3 days       Last Visit Date (If Applicable):  5/0/6296    Next Visit Date:    Visit date not found

## 2021-02-22 RX ORDER — ALLOPURINOL 300 MG/1
300 TABLET ORAL DAILY
Qty: 30 TABLET | Refills: 6 | Status: SHIPPED | OUTPATIENT
Start: 2021-02-22 | End: 2021-11-02

## 2021-02-22 NOTE — TELEPHONE ENCOUNTER
Evy Shaw is calling to request a refill on the following medication(s):    Medication Request:  Requested Prescriptions     Pending Prescriptions Disp Refills    allopurinol (ZYLOPRIM) 300 MG tablet 30 tablet 6     Sig: Take 1 tablet by mouth daily       Last Visit Date (If Applicable):  9/2/7469    Next Visit Date:    Visit date not found

## 2021-06-17 ENCOUNTER — OFFICE VISIT (OUTPATIENT)
Dept: FAMILY MEDICINE CLINIC | Age: 56
End: 2021-06-17
Payer: COMMERCIAL

## 2021-06-17 VITALS
WEIGHT: 232 LBS | SYSTOLIC BLOOD PRESSURE: 120 MMHG | OXYGEN SATURATION: 96 % | DIASTOLIC BLOOD PRESSURE: 70 MMHG | BODY MASS INDEX: 32.37 KG/M2 | HEART RATE: 97 BPM

## 2021-06-17 DIAGNOSIS — R53.83 FATIGUE, UNSPECIFIED TYPE: ICD-10-CM

## 2021-06-17 DIAGNOSIS — Z13.220 LIPID SCREENING: ICD-10-CM

## 2021-06-17 DIAGNOSIS — M1A.00X0 CHRONIC GOUTY ARTHRITIS: Primary | ICD-10-CM

## 2021-06-17 DIAGNOSIS — E66.09 CLASS 1 OBESITY DUE TO EXCESS CALORIES WITHOUT SERIOUS COMORBIDITY WITH BODY MASS INDEX (BMI) OF 32.0 TO 32.9 IN ADULT: ICD-10-CM

## 2021-06-17 DIAGNOSIS — Z12.5 PROSTATE CANCER SCREENING: ICD-10-CM

## 2021-06-17 DIAGNOSIS — N52.9 ERECTILE DYSFUNCTION, UNSPECIFIED ERECTILE DYSFUNCTION TYPE: ICD-10-CM

## 2021-06-17 DIAGNOSIS — M25.50 ARTHRALGIA, UNSPECIFIED JOINT: ICD-10-CM

## 2021-06-17 DIAGNOSIS — R06.83 SNORING: ICD-10-CM

## 2021-06-17 DIAGNOSIS — R73.01 IMPAIRED FASTING GLUCOSE: ICD-10-CM

## 2021-06-17 PROCEDURE — 99214 OFFICE O/P EST MOD 30 MIN: CPT | Performed by: FAMILY MEDICINE

## 2021-06-17 PROCEDURE — G8417 CALC BMI ABV UP PARAM F/U: HCPCS | Performed by: FAMILY MEDICINE

## 2021-06-17 PROCEDURE — 3017F COLORECTAL CA SCREEN DOC REV: CPT | Performed by: FAMILY MEDICINE

## 2021-06-17 PROCEDURE — G8427 DOCREV CUR MEDS BY ELIG CLIN: HCPCS | Performed by: FAMILY MEDICINE

## 2021-06-17 PROCEDURE — 1036F TOBACCO NON-USER: CPT | Performed by: FAMILY MEDICINE

## 2021-06-17 ASSESSMENT — PATIENT HEALTH QUESTIONNAIRE - PHQ9
SUM OF ALL RESPONSES TO PHQ QUESTIONS 1-9: 0
SUM OF ALL RESPONSES TO PHQ QUESTIONS 1-9: 0
2. FEELING DOWN, DEPRESSED OR HOPELESS: 0
SUM OF ALL RESPONSES TO PHQ9 QUESTIONS 1 & 2: 0
1. LITTLE INTEREST OR PLEASURE IN DOING THINGS: 0
SUM OF ALL RESPONSES TO PHQ QUESTIONS 1-9: 0

## 2021-06-17 ASSESSMENT — ENCOUNTER SYMPTOMS
COUGH: 0
RESPIRATORY NEGATIVE: 1
ABDOMINAL PAIN: 0
EYES NEGATIVE: 1
SWOLLEN GLANDS: 0
CHANGE IN BOWEL HABIT: 0
SORE THROAT: 0
VISUAL CHANGE: 0
ALLERGIC/IMMUNOLOGIC NEGATIVE: 1
VOMITING: 0
GASTROINTESTINAL NEGATIVE: 1
NAUSEA: 0

## 2021-06-17 NOTE — PATIENT INSTRUCTIONS
Patient Education        Starting a Weight Loss Plan: Care Instructions  Overview     If you're thinking about losing weight, it can be hard to know where to start. Your doctor can help you set up a weight loss plan that best meets your needs. You may want to take a class on nutrition or exercise, or you could join a weight loss support group. If you have questions about how to make changes to your eating or exercise habits, ask your doctor about seeing a registered dietitian or an exercise specialist.  It can be a big challenge to lose weight. But you don't have to make huge changes at once. Make small changes, and stick with them. When those changes become habit, add a few more changes. If you don't think you're ready to make changes right now, try to pick a date in the future. Make an appointment to see your doctor to discuss whether the time is right for you to start a plan. Follow-up care is a key part of your treatment and safety. Be sure to make and go to all appointments, and call your doctor if you are having problems. It's also a good idea to know your test results and keep a list of the medicines you take. How can you care for yourself at home? · Set realistic goals. Many people expect to lose much more weight than is likely. A weight loss of 5% to 10% of your body weight may be enough to improve your health. · Get family and friends involved to provide support. Talk to them about why you are trying to lose weight, and ask them to help. They can help by participating in exercise and having meals with you, even if they may be eating something different. · Find what works best for you. If you do not have time or do not like to cook, a program that offers meal replacement bars or shakes may be better for you.  Or if you like to prepare meals, finding a plan that includes daily menus and recipes may be best.  · Ask your doctor about other health professionals who can help you achieve your weight loss goals.  ? A dietitian can help you make healthy changes in your diet. ? An exercise specialist or  can help you develop a safe and effective exercise program.  ? A counselor or psychiatrist can help you cope with issues such as depression, anxiety, or family problems that can make it hard to focus on weight loss. · Consider joining a support group for people who are trying to lose weight. Your doctor can suggest groups in your area. Where can you learn more? Go to https://fypio.[employer]. org and sign in to your [employer] account. Enter O380 in the [facility] box to learn more about \"Starting a Weight Loss Plan: Care Instructions. \"     If you do not have an account, please click on the \"Sign Up Now\" link. Current as of: March 17, 2021               Content Version: 12.9  © 3601-9520 Healthwise, Incorporated. Care instructions adapted under license by [facility] ([facility]). If you have questions about a medical condition or this instruction, always ask your healthcare professional. [address] any warranty or liability for your use of this information.

## 2021-06-17 NOTE — ASSESSMENT & PLAN NOTE
Borderline controlled, continue current plan pending work up below and medication adherence emphasized

## 2021-06-17 NOTE — ASSESSMENT & PLAN NOTE
Discussed possible exacerbating factors (undiagnosed inflammatory pathology vs sleep apnea etc)  Proper diet and exercise habits

## 2021-06-17 NOTE — PROGRESS NOTES
AP[facility] Progress Note    Date:6/17/2021         Patient Kristi Nieto     YOB: 1965     Age:55 y.o. Assessment/Plan        Problem List Items Addressed This Visit        Musculoskeletal and Integument    Chronic gouty arthritis - Primary      Borderline controlled, continue current plan pending work up below and medication adherence emphasized         Relevant Orders    Uric Acid    Comprehensive Metabolic Panel       Other    Erectile dysfunction      Worsening  Declines prn medication  Will check PSA, testosterone         Relevant Orders    Comprehensive Metabolic Panel    Class 1 obesity due to excess calories without serious comorbidity with body mass index (BMI) of 32.0 to 32.9 in adult     Discussed possible exacerbating factors (undiagnosed inflammatory pathology vs sleep apnea etc)  Proper diet and exercise habits         Relevant Orders    Comprehensive Metabolic Panel    Snoring     Home sleep study ordered         Relevant Orders    Home Sleep Study      Other Visit Diagnoses     Fatigue, unspecified type        Relevant Orders    TSH with Reflex    Testosterone    Comprehensive Metabolic Panel    CBC Auto Differential    Home Sleep Study    Prostate cancer screening        Relevant Orders    PSA screening    Lipid screening        Relevant Orders    Lipid Panel    Arthralgia, unspecified joint        Relevant Orders    ADITYA Screen with Reflex    Rheumatoid Factor    Cyclic Citrul Peptide Antibody, IgG    Sedimentation rate, automated    C-Reactive Protein    Impaired fasting glucose        Relevant Orders    Hemoglobin A1C           Return in about 3 months (around 9/17/2021). Electronically signed by Padmini Drew DO on 6/17/21         Subjective     Myah Samano is a 48 y.o. male presenting today for   Chief Complaint   Patient presents with   [facility]    Fatigue    Pain     body pain    Weight Loss   .     Myah Samano is a 48 y.o. male here for discussion regarding weight loss. He has noted a weight gain of approximately 30 pounds over the last several years. He feels ideal weight is 200 pounds. History of eating disorders: none. Previous treatments for obesity include self-directed dieting. Obesity associated medical conditions: probable sleep apnea. Obesity associated medications: none. Joint/Muscle Pain  Patient complains of arthralgias, which have/has been present for several months. Pain is located in multiple joints. The pain is described as intermittent, moderate, aching. Associated symptoms include: crepitation. The patient has tried NSAIDs for pain, with minimal relief. Related to injury: no.    Ramón Beckham is a 48 y.o. male who presents with possible gout. Pain is located in the left MTP(s): 1st, and has been present for several years. Pain is described as throbbing, and is intermittent. Associated symptoms: decreased range of motion. The patient has colchicine and now allopurinol 300mg daily - and mobic prn. Related to injury: no. He was checking uric acid with Dr. Page Carlton    July 2019 had CPs and went to [facility] and everything was checked and fine. Around that time he started having aches in lower back especially on right side    Feet and knees still hurt. He stands pronated b/c of pain in foot/toe so left knee is painful. He had torn his meniscus in left knee b/c of that. His left calf is stiffer and bigger than right side. Had a lump in left anterior calf and had them do an U/S in the hospital and nothing was seen    Fatigue  This is a chronic problem. The current episode started more than 1 year ago. The problem occurs daily. The problem has been gradually worsening. Associated symptoms include arthralgias and fatigue.  Pertinent negatives include no abdominal pain, anorexia, change in bowel habit, chest pain, chills, congestion, coughing, diaphoresis, fever, headaches, joint swelling, myalgias, nausea, neck pain, numbness, rash, sore throat, swollen glands, urinary symptoms, vertigo, visual change, vomiting or weakness. Nothing aggravates the symptoms. He has tried nothing for the symptoms. Knee Pain   There was no injury mechanism (stands with ankle inverted to take pressure off left 1st MTP gout pain). The pain is present in the left knee. The pain is mild. The pain has been fluctuating since onset. Pertinent negatives include no inability to bear weight, loss of motion, loss of sensation, muscle weakness, numbness or tingling. He reports no foreign bodies present. The symptoms are aggravated by movement, palpation and weight bearing. He has tried nothing for the symptoms. The treatment provided no relief. Erectile Dysfunction  This is a chronic problem. The current episode started more than 1 month ago. The problem has been gradually worsening since onset. The nature of his difficulty is achieving erection, maintaining erection and penetration. He reports no anxiety, decreased libido or performance anxiety. Irritative symptoms do not include frequency, nocturia or urgency. Obstructive symptoms do not include dribbling, incomplete emptying, an intermittent stream, a slower stream, straining or a weak stream. Pertinent negatives include no chills, dysuria, genital pain, hematuria, hesitancy or inability to urinate. Past treatments include nothing. The treatment provided no relief. Review of Systems   Review of Systems   Constitutional: Positive for fatigue. Negative for chills, diaphoresis and fever. HENT: Negative. Negative for congestion and sore throat. Eyes: Negative. Respiratory: Negative. Negative for cough. Cardiovascular: Negative. Negative for chest pain. Gastrointestinal: Negative. Negative for abdominal pain, anorexia, change in bowel habit, nausea and vomiting. Endocrine: Negative. Genitourinary: Negative.   Negative for decreased libido, dysuria, frequency, hematuria, hesitancy, incomplete emptying, nocturia and urgency. Musculoskeletal: Positive for arthralgias. Negative for joint swelling, myalgias and neck pain. Skin: Negative. Negative for rash. Allergic/Immunologic: Negative. Neurological: Negative. Negative for vertigo, tingling, weakness, numbness and headaches. Hematological: Negative. Psychiatric/Behavioral: Negative. The patient is not nervous/anxious. All other systems reviewed and are negative. Medications     Current Outpatient Medications   Medication Sig Dispense Refill    allopurinol (ZYLOPRIM) 300 MG tablet Take 1 tablet by mouth daily 30 tablet 6    meloxicam (MOBIC) 15 MG tablet Take 1 tablet by mouth daily 30 tablet 3     No current facility-administered medications for this visit. Past History    Past Medical History:   has no past medical history on file. Social History:   reports that he has never smoked. He has never used smokeless tobacco. He reports that he does not drink alcohol and does not use drugs. Family History:   Family History   Problem Relation Age of Onset    Cancer Mother     Cancer Father     High Blood Pressure Father     Heart Disease Sister        Surgical History:   Past Surgical History:   Procedure Laterality Date    FRACTURE SURGERY          Physical Examination      Vitals:  /70 (Site: Left Upper Arm, Position: Sitting, Cuff Size: Medium Adult)   Pulse 97   Wt 232 lb (105.2 kg)   SpO2 96%   BMI 32.37 kg/m²     Physical Exam  Vitals and nursing note reviewed. Constitutional:       General: He is not in acute distress. Appearance: Normal appearance. He is obese. He is not ill-appearing, toxic-appearing or diaphoretic. HENT:      Head: Normocephalic and atraumatic. Right Ear: External ear normal.      Left Ear: External ear normal.   Eyes:      General: No scleral icterus. Right eye: No discharge. Left eye: No discharge.       Conjunctiva/sclera: Conjunctivae normal.   Cardiovascular: Rate and Rhythm: Normal rate and regular rhythm. Pulses: Normal pulses. Heart sounds: Normal heart sounds. No murmur heard. No friction rub. No gallop. Pulmonary:      Effort: Pulmonary effort is normal. No respiratory distress. Breath sounds: Normal breath sounds. No stridor. No wheezing, rhonchi or rales. Chest:      Chest wall: No tenderness. Skin:     General: Skin is warm. Coloration: Skin is not jaundiced or pale. Neurological:      Mental Status: He is alert and oriented to person, place, and time. Mental status is at baseline. Psychiatric:         Mood and Affect: Mood normal.         Behavior: Behavior normal.         Thought Content: Thought content normal.         Judgment: Judgment normal.         Labs/Imaging/Diagnostics   Labs:  No results found for: CMPWITHGFR, CBCAUTODIF, TSHFT4, LABA1C, LIPIDPAN    Imaging Last 24 Hours:  No image results found.

## 2021-06-22 ENCOUNTER — HOSPITAL ENCOUNTER (OUTPATIENT)
Age: 56
Setting detail: SPECIMEN
Discharge: HOME | End: 2021-06-22
Payer: COMMERCIAL

## 2021-06-22 DIAGNOSIS — Z13.220 LIPID SCREENING: ICD-10-CM

## 2021-06-22 DIAGNOSIS — R73.01 IMPAIRED FASTING GLUCOSE: ICD-10-CM

## 2021-06-22 DIAGNOSIS — E66.09 CLASS 1 OBESITY DUE TO EXCESS CALORIES WITHOUT SERIOUS COMORBIDITY WITH BODY MASS INDEX (BMI) OF 32.0 TO 32.9 IN ADULT: ICD-10-CM

## 2021-06-22 DIAGNOSIS — R53.83 FATIGUE, UNSPECIFIED TYPE: ICD-10-CM

## 2021-06-22 DIAGNOSIS — Z12.5 PROSTATE CANCER SCREENING: ICD-10-CM

## 2021-06-22 DIAGNOSIS — M1A.00X0 CHRONIC GOUTY ARTHRITIS: ICD-10-CM

## 2021-06-22 DIAGNOSIS — N52.9 ERECTILE DYSFUNCTION, UNSPECIFIED ERECTILE DYSFUNCTION TYPE: ICD-10-CM

## 2021-06-22 DIAGNOSIS — M25.50 ARTHRALGIA, UNSPECIFIED JOINT: ICD-10-CM

## 2021-06-22 LAB
ABSOLUTE EOS #: 0.16 K/UL (ref 0–0.44)
ABSOLUTE IMMATURE GRANULOCYTE: <0.03 K/UL (ref 0–0.3)
ABSOLUTE LYMPH #: 2.55 K/UL (ref 1.1–3.7)
ABSOLUTE MONO #: 0.64 K/UL (ref 0.1–1.2)
ALBUMIN SERPL-MCNC: 4.6 G/DL (ref 3.5–5.2)
ALBUMIN/GLOBULIN RATIO: 1.8 (ref 1–2.5)
ALP BLD-CCNC: 60 U/L (ref 40–129)
ALT SERPL-CCNC: 62 U/L (ref 5–41)
ANION GAP SERPL CALCULATED.3IONS-SCNC: 14 MMOL/L (ref 9–17)
AST SERPL-CCNC: 35 U/L
BASOPHILS # BLD: 1 % (ref 0–2)
BASOPHILS ABSOLUTE: 0.04 K/UL (ref 0–0.2)
BILIRUB SERPL-MCNC: 0.47 MG/DL (ref 0.3–1.2)
BUN BLDV-MCNC: 14 MG/DL (ref 6–20)
BUN/CREAT BLD: ABNORMAL (ref 9–20)
C-REACTIVE PROTEIN: <3 MG/L (ref 0–5)
CALCIUM SERPL-MCNC: 9.6 MG/DL (ref 8.6–10.4)
CHLORIDE BLD-SCNC: 105 MMOL/L (ref 98–107)
CHOLESTEROL/HDL RATIO: 5
CHOLESTEROL: 230 MG/DL
CO2: 22 MMOL/L (ref 20–31)
CREAT SERPL-MCNC: 1.07 MG/DL (ref 0.7–1.2)
DIFFERENTIAL TYPE: NORMAL
EOSINOPHILS RELATIVE PERCENT: 2 % (ref 1–4)
ESTIMATED AVERAGE GLUCOSE: 111 MG/DL
GFR AFRICAN AMERICAN: >60 ML/MIN
GFR NON-AFRICAN AMERICAN: >60 ML/MIN
GFR SERPL CREATININE-BSD FRML MDRD: ABNORMAL ML/MIN/{1.73_M2}
GFR SERPL CREATININE-BSD FRML MDRD: ABNORMAL ML/MIN/{1.73_M2}
GLUCOSE BLD-MCNC: 90 MG/DL (ref 70–99)
HBA1C MFR BLD: 5.5 % (ref 4–6)
HCT VFR BLD CALC: 45.3 % (ref 40.7–50.3)
HDLC SERPL-MCNC: 46 MG/DL
HEMOGLOBIN: 14.5 G/DL (ref 13–17)
IMMATURE GRANULOCYTES: 0 %
LDL CHOLESTEROL: 154 MG/DL (ref 0–130)
LYMPHOCYTES # BLD: 35 % (ref 24–43)
MCH RBC QN AUTO: 31.5 PG (ref 25.2–33.5)
MCHC RBC AUTO-ENTMCNC: 32 G/DL (ref 28.4–34.8)
MCV RBC AUTO: 98.3 FL (ref 82.6–102.9)
MONOCYTES # BLD: 9 % (ref 3–12)
NRBC AUTOMATED: 0 PER 100 WBC
PDW BLD-RTO: 13.2 % (ref 11.8–14.4)
PLATELET # BLD: 282 K/UL (ref 138–453)
PLATELET ESTIMATE: NORMAL
PMV BLD AUTO: 9.9 FL (ref 8.1–13.5)
POTASSIUM SERPL-SCNC: 4.6 MMOL/L (ref 3.7–5.3)
PROSTATE SPECIFIC ANTIGEN: 1.5 UG/L
RBC # BLD: 4.61 M/UL (ref 4.21–5.77)
RBC # BLD: NORMAL 10*6/UL
RHEUMATOID FACTOR: <10 IU/ML
SEDIMENTATION RATE, ERYTHROCYTE: 15 MM (ref 0–20)
SEG NEUTROPHILS: 53 % (ref 36–65)
SEGMENTED NEUTROPHILS ABSOLUTE COUNT: 3.82 K/UL (ref 1.5–8.1)
SODIUM BLD-SCNC: 141 MMOL/L (ref 135–144)
TESTOSTERONE TOTAL: 224 NG/DL (ref 220–1000)
TOTAL PROTEIN: 7.2 G/DL (ref 6.4–8.3)
TRIGL SERPL-MCNC: 152 MG/DL
TSH SERPL DL<=0.05 MIU/L-ACNC: 4.01 MIU/L (ref 0.3–5)
URIC ACID: 6.1 MG/DL (ref 3.4–7)
VLDLC SERPL CALC-MCNC: ABNORMAL MG/DL (ref 1–30)
WBC # BLD: 7.2 K/UL (ref 3.5–11.3)
WBC # BLD: NORMAL 10*3/UL

## 2021-06-23 LAB
ANTI DNA DOUBLE STRANDED: <0.5 IU/ML
ANTI-NUCLEAR ANTIBODY (ANA): NEGATIVE
CCP IGG ANTIBODIES: 0.6 U/ML (ref 0–7)
ENA ANTIBODIES SCREEN: 0.1 U/ML

## 2021-07-13 ENCOUNTER — HOSPITAL ENCOUNTER (OUTPATIENT)
Dept: SLEEP CENTER | Age: 56
Discharge: HOME | End: 2021-07-15
Payer: COMMERCIAL

## 2021-07-13 DIAGNOSIS — R06.83 SNORING: ICD-10-CM

## 2021-07-13 DIAGNOSIS — R53.83 FATIGUE, UNSPECIFIED TYPE: ICD-10-CM

## 2021-07-13 PROCEDURE — G0399 HOME SLEEP TEST/TYPE 3 PORTA: HCPCS

## 2021-07-28 LAB — STATUS: NORMAL

## 2021-07-29 DIAGNOSIS — G47.33 OSA (OBSTRUCTIVE SLEEP APNEA): Primary | ICD-10-CM

## 2021-09-21 ENCOUNTER — OFFICE VISIT (OUTPATIENT)
Dept: FAMILY MEDICINE CLINIC | Age: 56
End: 2021-09-21
Payer: COMMERCIAL

## 2021-09-21 VITALS
HEIGHT: 71 IN | BODY MASS INDEX: 32.09 KG/M2 | OXYGEN SATURATION: 98 % | SYSTOLIC BLOOD PRESSURE: 130 MMHG | DIASTOLIC BLOOD PRESSURE: 70 MMHG | HEART RATE: 75 BPM | WEIGHT: 229.2 LBS

## 2021-09-21 DIAGNOSIS — M1A.00X0 CHRONIC GOUTY ARTHRITIS: ICD-10-CM

## 2021-09-21 DIAGNOSIS — G47.33 OSA (OBSTRUCTIVE SLEEP APNEA): Primary | ICD-10-CM

## 2021-09-21 DIAGNOSIS — E78.5 DYSLIPIDEMIA: ICD-10-CM

## 2021-09-21 DIAGNOSIS — R53.83 FATIGUE, UNSPECIFIED TYPE: ICD-10-CM

## 2021-09-21 PROCEDURE — 1036F TOBACCO NON-USER: CPT | Performed by: FAMILY MEDICINE

## 2021-09-21 PROCEDURE — 3017F COLORECTAL CA SCREEN DOC REV: CPT | Performed by: FAMILY MEDICINE

## 2021-09-21 PROCEDURE — 99214 OFFICE O/P EST MOD 30 MIN: CPT | Performed by: FAMILY MEDICINE

## 2021-09-21 PROCEDURE — G8427 DOCREV CUR MEDS BY ELIG CLIN: HCPCS | Performed by: FAMILY MEDICINE

## 2021-09-21 PROCEDURE — G8417 CALC BMI ABV UP PARAM F/U: HCPCS | Performed by: FAMILY MEDICINE

## 2021-09-21 SDOH — ECONOMIC STABILITY: FOOD INSECURITY: WITHIN THE PAST 12 MONTHS, THE FOOD YOU BOUGHT JUST DIDN'T LAST AND YOU DIDN'T HAVE MONEY TO GET MORE.: NEVER TRUE

## 2021-09-21 SDOH — ECONOMIC STABILITY: FOOD INSECURITY: WITHIN THE PAST 12 MONTHS, YOU WORRIED THAT YOUR FOOD WOULD RUN OUT BEFORE YOU GOT MONEY TO BUY MORE.: NEVER TRUE

## 2021-09-21 ASSESSMENT — PATIENT HEALTH QUESTIONNAIRE - PHQ9
SUM OF ALL RESPONSES TO PHQ QUESTIONS 1-9: 0
1. LITTLE INTEREST OR PLEASURE IN DOING THINGS: 0
2. FEELING DOWN, DEPRESSED OR HOPELESS: 0
SUM OF ALL RESPONSES TO PHQ QUESTIONS 1-9: 0
SUM OF ALL RESPONSES TO PHQ QUESTIONS 1-9: 0
SUM OF ALL RESPONSES TO PHQ9 QUESTIONS 1 & 2: 0

## 2021-09-21 ASSESSMENT — ENCOUNTER SYMPTOMS
ALLERGIC/IMMUNOLOGIC NEGATIVE: 1
EYES NEGATIVE: 1
SHORTNESS OF BREATH: 0
GASTROINTESTINAL NEGATIVE: 1
RESPIRATORY NEGATIVE: 1

## 2021-09-21 ASSESSMENT — SOCIAL DETERMINANTS OF HEALTH (SDOH): HOW HARD IS IT FOR YOU TO PAY FOR THE VERY BASICS LIKE FOOD, HOUSING, MEDICAL CARE, AND HEATING?: NOT HARD AT ALL

## 2021-09-21 NOTE — PROGRESS NOTES
[facility] Progress Note    Date:9/21/2021         Patient Ruth Vivar     YOB: 1965     Age:55 y.o. Assessment/Plan        Problem List Items Addressed This Visit        Respiratory    KEVIN (obstructive sleep apnea) - Primary      Uncontrolled, changes made today: has in lab sleep study scheduled            Musculoskeletal and Integument    Chronic gouty arthritis      Well-controlled, continue current medications, continue current treatment plan, medication adherence emphasized and lifestyle modifications recommended            Other    Dyslipidemia      Uncontrolled, continue current treatment plan and lifestyle modifications recommended         Relevant Orders    Lipid Panel      Other Visit Diagnoses     Fatigue, unspecified type        Relevant Orders    Testosterone           Return in about 3 months (around 12/21/2021). Electronically signed by Lore Griffin DO on 9/21/21         Subjective     Brian Mahan is a 48 y.o. male presenting today for   Chief Complaint   Patient presents with    3 Month Follow-Up   . Brian Mahan is a 48 y.o. male who presents with possible gout. Pain is located in the left MTP(s): 1st, and has been present for several years. Pain is described as throbbing, and is intermittent. Associated symptoms: decreased range of motion. The patient has colchicine and now allopurinol 300mg daily - and mobic prn. Related to injury: no.     Sleep Apnea:  Current treatment: weight management program and avoidance of alcohol and sedating medications. Compliance: compliant most of the time. Residual symptoms include: daytime fatigue. Home sleep study positive for KEVIN - has in lab test scheduled  Hyperlipidemia  This is a chronic problem. The current episode started more than 1 year ago. The problem is uncontrolled. Recent lipid tests were reviewed and are high. Exacerbating diseases include obesity.  He has no history of chronic renal disease, diabetes, hypothyroidism, liver disease or nephrotic syndrome. Pertinent negatives include no chest pain, focal sensory loss, focal weakness, leg pain, myalgias or shortness of breath. He is currently on no antihyperlipidemic treatment. Review of Systems   Review of Systems   Constitutional: Negative. HENT: Negative. Eyes: Negative. Respiratory: Negative. Negative for shortness of breath. Cardiovascular: Negative. Negative for chest pain. Gastrointestinal: Negative. Endocrine: Negative. Genitourinary: Negative. Musculoskeletal: Negative. Negative for myalgias. Skin: Negative. Allergic/Immunologic: Negative. Neurological: Negative. Negative for focal weakness. Hematological: Negative. Psychiatric/Behavioral: Negative. All other systems reviewed and are negative. Medications     Current Outpatient Medications   Medication Sig Dispense Refill    allopurinol (ZYLOPRIM) 300 MG tablet Take 1 tablet by mouth daily 30 tablet 6    meloxicam (MOBIC) 15 MG tablet Take 1 tablet by mouth daily 30 tablet 3     No current facility-administered medications for this visit. Past History    Past Medical History:   has no past medical history on file. Social History:   reports that he has never smoked. He has never used smokeless tobacco. He reports that he does not drink alcohol and does not use drugs. Family History:   Family History   Problem Relation Age of Onset    Cancer Mother     Cancer Father     High Blood Pressure Father     Heart Disease Sister        Surgical History:   Past Surgical History:   Procedure Laterality Date    FRACTURE SURGERY          Physical Examination      Vitals:  /70 (Site: Left Upper Arm, Position: Sitting, Cuff Size: Medium Adult)   Pulse 75   Ht 5' 10.5\" (1.791 m)   Wt 229 lb 3.2 oz (104 kg)   SpO2 98%   BMI 32.42 kg/m²     Physical Exam  Vitals and nursing note reviewed.    Constitutional:       General: He is not in acute distress. Appearance: Normal appearance. He is obese. He is not ill-appearing, toxic-appearing or diaphoretic. HENT:      Head: Normocephalic and atraumatic. Right Ear: External ear normal.      Left Ear: External ear normal.   Eyes:      General: No scleral icterus. Right eye: No discharge. Left eye: No discharge. Conjunctiva/sclera: Conjunctivae normal.   Cardiovascular:      Rate and Rhythm: Normal rate and regular rhythm. Pulses: Normal pulses. Heart sounds: Normal heart sounds. No murmur heard. No friction rub. No gallop. Pulmonary:      Effort: Pulmonary effort is normal. No respiratory distress. Breath sounds: Normal breath sounds. No stridor. No wheezing, rhonchi or rales. Chest:      Chest wall: No tenderness. Skin:     General: Skin is warm. Coloration: Skin is not jaundiced or pale. Neurological:      Mental Status: He is alert and oriented to person, place, and time. Mental status is at baseline. Psychiatric:         Mood and Affect: Mood normal.         Behavior: Behavior normal.         Thought Content: Thought content normal.         Judgment: Judgment normal.         Labs/Imaging/Diagnostics   Labs:  Hemoglobin A1C   Date Value Ref Range Status   06/22/2021 5.5 4.0 - 6.0 % Final       Imaging Last 24 Hours:  No image results found.

## 2021-09-21 NOTE — PATIENT INSTRUCTIONS
Patient Education        Sleep Apnea: Care Instructions  Overview     Sleep apnea means that you frequently stop breathing for 10 seconds or longer during sleep. It can be mild to severe, based on the number of times an hour that you stop breathing or have slowed breathing. Blocked or narrowed airways in your nose, mouth, or throat can cause sleep apnea. Your airway can become blocked when your throat muscles and tongue relax during sleep. You can help treat sleep apnea at home by making lifestyle changes. You also can use a CPAP breathing machine that keeps tissues in the throat from blocking your airway. Or your doctor may suggest that you use a breathing device while you sleep. It helps keep your airway open. This could be a device that you put in your mouth. In some cases, surgery may be needed to remove enlarged tissues in the throat. Follow-up care is a key part of your treatment and safety. Be sure to make and go to all appointments, and call your doctor if you are having problems. It's also a good idea to know your test results and keep a list of the medicines you take. How can you care for yourself at home? · Lose weight, if needed. · Sleep on your side. It may help mild apnea. · Avoid alcohol and medicines such as sleeping pills, opioids, or sedatives before bed. · Don't smoke. If you need help quitting, talk to your doctor. · Prop up the head of your bed. · Treat breathing problems, such as a stuffy nose, that are caused by a cold or allergies. · Try a continuous positive airway pressure (CPAP) breathing machine if your doctor recommends it. · If CPAP doesn't work for you, ask your doctor if you can try other masks, settings, or breathing machines. · Try oral breathing devices or other nasal devices. · Talk to your doctor if your nose feels dry or bleeds, or if it gets runny or stuffy when you use a breathing machine.   · Tell your doctor if you're sleepy during the day and it affects your daily life. Don't drive or operate machinery when you're drowsy. When should you call for help? Watch closely for changes in your health, and be sure to contact your doctor if:    · You still have sleep apnea even though you have made lifestyle changes.     · You are thinking of trying a device such as CPAP.     · You are having problems using a CPAP or similar machine.     · You are still sleepy during the day, and it affects your daily life. Where can you learn more? Go to https://[employer]."[employer]". org and sign in to your phorus account. Enter V264 in the [employer] box to learn more about \"Sleep Apnea: Care Instructions. \"     If you do not have an account, please click on the \"Sign Up Now\" link. Current as of: October 26, 2020               Content Version: 12.9  © 9661-7069 Healthwise, Incorporated. Care instructions adapted under license by [facility] [address] If you have questions about a medical condition or this instruction, always ask your healthcare professional. [place] [address] any warranty or liability for your use of this information.

## 2021-10-15 ENCOUNTER — HOSPITAL ENCOUNTER (OUTPATIENT)
Dept: LAB | Age: 56
Setting detail: SPECIMEN
Discharge: HOME | End: 2021-10-15
Payer: COMMERCIAL

## 2021-10-15 DIAGNOSIS — Z01.818 PREOP TESTING: Primary | ICD-10-CM

## 2021-10-15 PROCEDURE — U0003 INFECTIOUS AGENT DETECTION BY NUCLEIC ACID (DNA OR RNA); SEVERE ACUTE RESPIRATORY SYNDROME CORONAVIRUS 2 (SARS-COV-2) (CORONAVIRUS DISEASE [COVID-19]), AMPLIFIED PROBE TECHNIQUE, MAKING USE OF HIGH THROUGHPUT TECHNOLOGIES AS DESCRIBED BY CMS-2020-01-R: HCPCS

## 2021-10-15 PROCEDURE — U0005 INFEC AGEN DETEC AMPLI PROBE: HCPCS

## 2021-10-16 LAB
SARS-COV-2: NORMAL
SARS-COV-2: NOT DETECTED
SOURCE: NORMAL

## 2021-10-18 ENCOUNTER — HOSPITAL ENCOUNTER (OUTPATIENT)
Dept: SLEEP CENTER | Age: 56
Discharge: HOME | End: 2021-10-20
Payer: COMMERCIAL

## 2021-10-18 DIAGNOSIS — G47.33 OSA (OBSTRUCTIVE SLEEP APNEA): ICD-10-CM

## 2021-10-18 PROCEDURE — 95811 POLYSOM 6/>YRS CPAP 4/> PARM: CPT

## 2021-10-19 VITALS
BODY MASS INDEX: 32.06 KG/M2 | TEMPERATURE: 95.8 F | RESPIRATION RATE: 12 BRPM | HEIGHT: 71 IN | HEART RATE: 64 BPM | WEIGHT: 229 LBS

## 2021-11-02 RX ORDER — ALLOPURINOL 300 MG/1
300 TABLET ORAL DAILY
Qty: 30 TABLET | Refills: 1 | Status: SHIPPED | OUTPATIENT
Start: 2021-11-02 | End: 2022-01-03

## 2021-11-02 NOTE — TELEPHONE ENCOUNTER
Shandra Martínez is calling to request a refill on the following medication(s):    Medication Request:  Requested Prescriptions     Pending Prescriptions Disp Refills    allopurinol (ZYLOPRIM) 300 MG tablet [Pharmacy Med Name: ALLOPURINOL 300 MG TABLET] 30 tablet 6     Sig: take 1 tablet by mouth daily       Last Visit Date (If Applicable):  5/15/8749    Next Visit Date:    12/21/2021

## 2021-11-09 ENCOUNTER — TELEPHONE (OUTPATIENT)
Dept: FAMILY MEDICINE CLINIC | Age: 56
End: 2021-11-09
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
## 2021-11-09 NOTE — TELEPHONE ENCOUNTER
pts wife wants to know if there is something that can be done to determine if pt has internal bleeding?

## 2021-11-10 LAB — STATUS: NORMAL

## 2021-11-16 ENCOUNTER — OFFICE VISIT (OUTPATIENT)
Dept: FAMILY MEDICINE CLINIC | Age: 56
End: 2021-11-16
Payer: COMMERCIAL

## 2021-11-16 VITALS
DIASTOLIC BLOOD PRESSURE: 80 MMHG | TEMPERATURE: 98.6 F | WEIGHT: 235.6 LBS | OXYGEN SATURATION: 98 % | SYSTOLIC BLOOD PRESSURE: 120 MMHG | HEART RATE: 105 BPM | BODY MASS INDEX: 33.33 KG/M2
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
## 2021-11-16 DIAGNOSIS — K40.20 NON-RECURRENT BILATERAL INGUINAL HERNIA WITHOUT OBSTRUCTION OR GANGRENE: ICD-10-CM

## 2021-11-16 DIAGNOSIS — K57.90 DIVERTICULOSIS: ICD-10-CM

## 2021-11-16 DIAGNOSIS — N20.0 BILATERAL NEPHROLITHIASIS: ICD-10-CM

## 2021-11-16 DIAGNOSIS — K76.0 NAFLD (NONALCOHOLIC FATTY LIVER DISEASE): ICD-10-CM

## 2021-11-16 DIAGNOSIS — R07.89 ANTERIOR CHEST WALL PAIN: ICD-10-CM

## 2021-11-16 PROCEDURE — 1111F DSCHRG MED/CURRENT MED MERGE: CPT | Performed by: FAMILY MEDICINE

## 2021-11-16 PROCEDURE — G8417 CALC BMI ABV UP PARAM F/U: HCPCS | Performed by: FAMILY MEDICINE

## 2021-11-16 PROCEDURE — 1036F TOBACCO NON-USER: CPT | Performed by: FAMILY MEDICINE

## 2021-11-16 PROCEDURE — 99214 OFFICE O/P EST MOD 30 MIN: CPT | Performed by: FAMILY MEDICINE

## 2021-11-16 PROCEDURE — G8427 DOCREV CUR MEDS BY ELIG CLIN: HCPCS | Performed by: FAMILY MEDICINE

## 2021-11-16 PROCEDURE — G8484 FLU IMMUNIZE NO ADMIN: HCPCS | Performed by: FAMILY MEDICINE

## 2021-11-16 PROCEDURE — 3017F COLORECTAL CA SCREEN DOC REV: CPT | Performed by: FAMILY MEDICINE

## 2021-11-16 RX ORDER — TRAMADOL HYDROCHLORIDE 50 MG/1
50 TABLET ORAL EVERY 4 HOURS PRN
Qty: 30 TABLET | Refills: 0 | Status: SHIPPED | OUTPATIENT
Start: 2021-11-16 | End: 2021-11-21

## 2021-11-16 ASSESSMENT — PATIENT HEALTH QUESTIONNAIRE - PHQ9
SUM OF ALL RESPONSES TO PHQ QUESTIONS 1-9: 0
2. FEELING DOWN, DEPRESSED OR HOPELESS: 0
SUM OF ALL RESPONSES TO PHQ QUESTIONS 1-9: 0
1. LITTLE INTEREST OR PLEASURE IN DOING THINGS: 0
SUM OF ALL RESPONSES TO PHQ9 QUESTIONS 1 & 2: 0
SUM OF ALL RESPONSES TO PHQ QUESTIONS 1-9: 0

## 2021-11-16 ASSESSMENT — ENCOUNTER SYMPTOMS
ALLERGIC/IMMUNOLOGIC NEGATIVE: 1
GASTROINTESTINAL NEGATIVE: 1
EYES NEGATIVE: 1
RESPIRATORY NEGATIVE: 1

## 2021-11-16 NOTE — PATIENT INSTRUCTIONS
Patient Education   Patient Education        Kidney Stone: Care Instructions  Your Care Instructions     Kidney stones are formed when salts, minerals, and other substances normally found in the urine clump together. They can be as small as grains of sand or, rarely, as large as golf balls. While the stone is traveling through the ureter, which is the tube that carries urine from the kidney to the bladder, you will probably feel pain. The pain may be mild or very severe. You may also have some blood in your urine. As soon as the stone reaches the bladder, any intense pain should go away. If a stone is too large to pass on its own, you may need a medical procedure to help you pass the stone. The doctor has checked you carefully, but problems can develop later. If you notice any problems or new symptoms, get medical treatment right away. Follow-up care is a key part of your treatment and safety. Be sure to make and go to all appointments, and call your doctor if you are having problems. It's also a good idea to know your test results and keep a list of the medicines you take. How can you care for yourself at home? · Drink plenty of fluids. If you have kidney, heart, or liver disease and have to limit fluids, talk with your doctor before you increase the amount of fluids you drink. · Take pain medicines exactly as directed. Call your doctor if you think you are having a problem with your medicine. ? If the doctor gave you a prescription medicine for pain, take it as prescribed. ? If you are not taking a prescription pain medicine, ask your doctor if you can take an over-the-counter medicine. Read and follow all instructions on the label. · Your doctor may ask you to strain your urine so that you can collect your kidney stone when it passes. You can use a kitchen strainer or a tea strainer to catch the stone. Store it in a plastic bag until you see your doctor again.   Preventing future kidney stones  Some changes in your diet may help prevent kidney stones. Depending on the cause of your stones, your doctor may recommend that you:  · Drink plenty of fluids. If you have kidney, heart, or liver disease and have to limit fluids, talk with your doctor before you increase the amount of fluids you drink. · Limit coffee, tea, and alcohol. Also avoid grapefruit juice. · Do not take more than the recommended daily dose of vitamins C and D.  · Avoid antacids such as Gaviscon, Maalox, Mylanta, or Tums. · Limit the amount of salt (sodium) in your diet. · Eat a balanced diet that is not too high in protein. · Limit foods that are high in a substance called oxalate, which can cause kidney stones. These foods include dark green vegetables, rhubarb, chocolate, wheat bran, nuts, cranberries, and beans. When should you call for help? Call your doctor now or seek immediate medical care if:    · You cannot keep down fluids.     · Your pain gets worse.     · You have a fever or chills.     · You have new or worse pain in your back just below your rib cage (the flank area).     · You have new or more blood in your urine. Watch closely for changes in your health, and be sure to contact your doctor if:    · You do not get better as expected. Where can you learn more? Go to https://[employer]. org and sign in to your [employer] account. Enter N551 in the [employer] party supplies & rentals box to learn more about \"Kidney Stone: Care Instructions. \"     If you do not have an account, please click on the \"Sign Up Now\" link. Current as of: December 17, 2020               Content Version: 13.0  © 4459-6147 [employer]. Care instructions adapted under license by [facility]. If you have questions about a medical condition or this instruction, always ask your healthcare professional. [place] [address] any warranty or liability for your use of this information.             Inguinal Hernia: Care Instructions  Your Care Instructions     An inguinal hernia occurs when tissue bulges through a weak spot in your groin area. You may see or feel a tender bulge in the groin or scrotum. You may also have pain, pressure or burning, or a feeling that something has \"given way. \"  Hernias are caused by a weakness in the belly wall. The bulge or discomfort may occur after heavy lifting, straining, or coughing. Hernias do not heal on their own, and they tend to get worse over time. If your hernia does not bother you, you most likely can wait to have surgery. Your hernia may get worse, but it may not. In some cases, hernias that are small and painless may never need to be repaired. Follow-up care is a key part of your treatment and safety. Be sure to make and go to all appointments, and call your doctor if you are having problems. It's also a good idea to know your test results and keep a list of the medicines you take. How can you care for yourself at home? · Take pain medicines exactly as directed. ? If the doctor gave you a prescription medicine for pain, take it as prescribed. ? If you are not taking a prescription pain medicine, ask your doctor if you can take an over-the-counter medicine. · Use proper lifting techniques, and avoid heavy lifting if you can. To lift things more safely, bend your knees and let your arms and legs do the work. Keep your back straight, and do not bend over at the waist. Keep the load as close to your body as you can. Move your feet instead of turning or twisting your body. · Lose weight if you are overweight. · Include fruits, vegetables, legumes, and whole grains in your diet each day. These foods are high in fiber and will make it easier to avoid straining during bowel movements. · Do not smoke. Smoking can cause coughing, which can cause your hernia to bulge. If you need help quitting, talk to your doctor about stop-smoking programs and medicines.  These can increase your chances of quitting for good. When should you call for help? Call your doctor now or seek immediate medical care if:    · You have new or worse belly pain.     · You are vomiting.     · You cannot pass stools or gas.     · You cannot push the hernia back into place with gentle pressure when you are lying down.     · The area over the hernia turns red or becomes tender. Watch closely for changes in your health, and be sure to contact your doctor if you have any problems. Where can you learn more? Go to https://[employer]. org and sign in to your [employer] account. Enter V402 in the [employer]ass box to learn more about \"Inguinal Hernia: Care Instructions. \"     If you do not have an account, please click on the \"Sign Up Now\" link. Current as of: February 10, 2021               Content Version: 13.0  © 7797-6068 [employer]. Care instructions adapted under license by [facility] [place] ([facility]). If you have questions about a medical condition or this instruction, always ask your healthcare professional. [address] any warranty or liability for your use of this information.
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
## 2021-11-17 ENCOUNTER — TELEPHONE (OUTPATIENT)
Dept: SURGERY | Age: 56
End: 2021-11-17

## 2021-11-18 DIAGNOSIS — G47.33 OSA (OBSTRUCTIVE SLEEP APNEA): Primary | ICD-10-CM

## 2021-11-19 ENCOUNTER — HOSPITAL ENCOUNTER (OUTPATIENT)
Age: 56
Setting detail: SPECIMEN
Discharge: HOME | End: 2021-11-19

## 2021-11-19 ENCOUNTER — HOSPITAL ENCOUNTER (OUTPATIENT)
Dept: GENERAL RADIOLOGY | Facility: CLINIC | Age: 56
Discharge: HOME | End: 2021-11-21
Payer: COMMERCIAL

## 2021-11-19 DIAGNOSIS — E78.5 DYSLIPIDEMIA: ICD-10-CM

## 2021-11-19 DIAGNOSIS — R07.89 ANTERIOR CHEST WALL PAIN: ICD-10-CM
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
## 2021-11-19 DIAGNOSIS — R53.83 FATIGUE, UNSPECIFIED TYPE: ICD-10-CM

## 2021-11-19 LAB
CHOLESTEROL/HDL RATIO: 4.9
CHOLESTEROL: 226 MG/DL
HDLC SERPL-MCNC: 46 MG/DL
LDL CHOLESTEROL: 158 MG/DL (ref 0–130)
TESTOSTERONE TOTAL: 254 NG/DL (ref 220–1000)
TRIGL SERPL-MCNC: 112 MG/DL
VLDLC SERPL CALC-MCNC: ABNORMAL MG/DL (ref 1–30)

## 2021-11-19 PROCEDURE — 71120 X-RAY EXAM BREASTBONE 2/>VWS: CPT

## 2021-12-15 ENCOUNTER — TELEPHONE (OUTPATIENT)
Dept: SURGERY | Age: 56
End: 2021-12-15

## 2022-07-19 DIAGNOSIS — M1A.00X0 CHRONIC GOUTY ARTHRITIS: ICD-10-CM

## 2022-07-19 RX ORDER — ALLOPURINOL 300 MG/1
TABLET ORAL
Qty: 30 TABLET | Refills: 5 | Status: SHIPPED | OUTPATIENT
Start: 2022-07-19

## 2022-07-19 NOTE — TELEPHONE ENCOUNTER
Shandra Martínez is calling to request a refill on the following medication(s):    Medication Request:  Requested Prescriptions     Pending Prescriptions Disp Refills    allopurinol (ZYLOPRIM) 300 MG tablet [Pharmacy Med Name: ALLOPURINOL 300 MG TABLET] 30 tablet 5     Sig: take 1 tablet by mouth once daily       Last Visit Date (If Applicable):  83/01/4526    Next Visit Date:    Visit date not found

## 2022-09-30 ENCOUNTER — TELEPHONE (OUTPATIENT)
Dept: FAMILY MEDICINE CLINIC | Age: 57
End: 2022-09-30

## 2022-09-30 NOTE — TELEPHONE ENCOUNTER
Patient called. Had Sleep Test ordered on 7/29/21 and had the study done on 10/18/21. There is an order for DME placed on 11/18/21 that is still active. Cruzito Bella said he went through the fitting for the machine but was told due to [place] and other delays in [place] it will take awhile to get to him. He still does not have it to this day. Today he just had to opportunity to call and follow up with the machine status. Do we have any information about this we can tell him? If we do, please advise the patient. Thank you.